# Patient Record
Sex: FEMALE | Race: WHITE | URBAN - METROPOLITAN AREA
[De-identification: names, ages, dates, MRNs, and addresses within clinical notes are randomized per-mention and may not be internally consistent; named-entity substitution may affect disease eponyms.]

---

## 2018-07-30 ENCOUNTER — APPOINTMENT (OUTPATIENT)
Dept: URBAN - METROPOLITAN AREA SURGERY 9 | Age: 53
Setting detail: DERMATOLOGY
End: 2018-07-30

## 2018-07-30 DIAGNOSIS — L98.8 OTHER SPECIFIED DISORDERS OF THE SKIN AND SUBCUTANEOUS TISSUE: ICD-10-CM

## 2018-07-30 PROCEDURE — OTHER BOTOX: OTHER

## 2018-07-30 PROCEDURE — OTHER FILLERS: OTHER

## 2019-08-12 ENCOUNTER — APPOINTMENT (OUTPATIENT)
Dept: URBAN - METROPOLITAN AREA SURGERY 9 | Age: 54
Setting detail: DERMATOLOGY
End: 2019-08-12

## 2019-08-12 DIAGNOSIS — L98.8 OTHER SPECIFIED DISORDERS OF THE SKIN AND SUBCUTANEOUS TISSUE: ICD-10-CM

## 2019-08-12 PROCEDURE — OTHER BOTOX: OTHER

## 2023-05-26 ENCOUNTER — APPOINTMENT (OUTPATIENT)
Dept: URBAN - METROPOLITAN AREA SURGERY 9 | Age: 58
Setting detail: DERMATOLOGY
End: 2023-05-26

## 2023-05-26 DIAGNOSIS — Z41.9 ENCOUNTER FOR PROCEDURE FOR PURPOSES OTHER THAN REMEDYING HEALTH STATE, UNSPECIFIED: ICD-10-CM

## 2023-05-26 PROCEDURE — OTHER BOTOX: OTHER

## 2023-05-26 NOTE — PROCEDURE: BOTOX
Show Depressor Anguli Units: Yes
Additional Area 2 Units: 0
Periorbital Skin Units: 16
Expiration Date (Month Year): 7/2025
Additional Area 1 Location: upper lip
Additional Area 6 Location: temporalis
Detail Level: Detailed
Additional Area 1 Units: 4
Additional Area 3 Location: nasalis
Lot #: [id number]
Show Inventory Tab: Show
Post-Care Instructions: Avoid exercise, inversion, massage or facials x 4-8 hours. Skin care regimen discussed
Additional Area 5 Location: orbicularis oris
Consent: Written consent obtained. Risks include but not limited to lid/brow ptosis, bruising, swelling, diplopia, temporary effect, incomplete chemical denervation.
Incrementing Botox Units: By 0.5 Units
Additional Area 2 Location: lower lip
Forehead Units: 6
Additional Area 2 Units: 2
Additional Area 4 Location: temporal fusion lines